# Patient Record
Sex: FEMALE | Race: WHITE | NOT HISPANIC OR LATINO
[De-identification: names, ages, dates, MRNs, and addresses within clinical notes are randomized per-mention and may not be internally consistent; named-entity substitution may affect disease eponyms.]

---

## 2018-11-10 PROBLEM — Z00.00 ENCOUNTER FOR PREVENTIVE HEALTH EXAMINATION: Status: ACTIVE | Noted: 2018-11-10

## 2018-12-10 ENCOUNTER — APPOINTMENT (OUTPATIENT)
Dept: ORTHOPEDIC SURGERY | Facility: CLINIC | Age: 63
End: 2018-12-10
Payer: COMMERCIAL

## 2018-12-10 VITALS
HEART RATE: 91 BPM | SYSTOLIC BLOOD PRESSURE: 193 MMHG | TEMPERATURE: 98.1 F | BODY MASS INDEX: 38.57 KG/M2 | WEIGHT: 240 LBS | DIASTOLIC BLOOD PRESSURE: 100 MMHG | HEIGHT: 66 IN

## 2018-12-10 DIAGNOSIS — Z82.61 FAMILY HISTORY OF ARTHRITIS: ICD-10-CM

## 2018-12-10 DIAGNOSIS — M25.561 PAIN IN RIGHT KNEE: ICD-10-CM

## 2018-12-10 PROCEDURE — 73562 X-RAY EXAM OF KNEE 3: CPT | Mod: RT

## 2018-12-10 PROCEDURE — 20610 DRAIN/INJ JOINT/BURSA W/O US: CPT | Mod: RT

## 2018-12-10 PROCEDURE — 99204 OFFICE O/P NEW MOD 45 MIN: CPT | Mod: 25

## 2019-03-18 ENCOUNTER — APPOINTMENT (OUTPATIENT)
Dept: ORTHOPEDIC SURGERY | Facility: CLINIC | Age: 64
End: 2019-03-18
Payer: COMMERCIAL

## 2019-03-18 VITALS — HEIGHT: 66 IN | TEMPERATURE: 98.1 F | WEIGHT: 240 LBS | BODY MASS INDEX: 38.57 KG/M2

## 2019-03-18 DIAGNOSIS — M17.11 UNILATERAL PRIMARY OSTEOARTHRITIS, RIGHT KNEE: ICD-10-CM

## 2019-03-18 PROCEDURE — 20610 DRAIN/INJ JOINT/BURSA W/O US: CPT

## 2019-03-18 PROCEDURE — 99214 OFFICE O/P EST MOD 30 MIN: CPT | Mod: 25

## 2019-03-18 NOTE — ADDENDUM
[FreeTextEntry1] : I, Demario Gandara, acted solely as a scribe for Dr. Sukhwinder Dyer on this date 03/18/2019.

## 2019-03-18 NOTE — PHYSICAL EXAM
[LE] : 5/5 motor strength in bilateral lower extremities [ALL] : dorsalis pedis, posterior tibial, femoral, popliteal, and radial 2+ and symmetric bilaterally [Obese] : obese [Acute Distress] : not in acute distress [Poor Appearance] : well-appearing [de-identified] : GENERAL APPEARANCE: Well nourished and hydrated, pleasant, alert, and oriented x 3. Appears their stated age. \par HEENT: Normocephalic, extraocular eye motion intact. Nasal septum midline. Oral cavity clear. External auditory canal clear. \par RESPIRATORY: Breath sounds clear and audible in all lobes. No wheezing, No accessory muscle use.\par CARDIOVASCULAR: No apparent abnormalities. No lower leg edema. No varicosities. Pedal pulses are palpable.\par NEUROLOGIC: Sensation is normal, no muscle weakness in the upper or lower extremities.\par DERMATOLOGIC: No apparent skin lesions, moist, warm, no rash.\par SPINE: Cervical spine appears normal and moves freely; thoracic spine appears normal and moves freely; lumbosacral spine appears normal and moves freely, normal, nontender.\par MUSCULOSKELETAL: Hands, wrists, and elbows are normal and move freely, shoulders are normal and move freely.  [de-identified] : Right knee examination demonstrates medial joint line tenderness, pain with ROM 0-100 degrees. \par

## 2019-03-18 NOTE — HISTORY OF PRESENT ILLNESS
[___ yrs] : [unfilled] year(s) ago [Worsening] : worsening [Constant] : ~He/She~ states the symptoms seem to be constant [Walking] : worsened by walking [Knee Flexion] : worsened with knee flexion [NSAIDs] : relieved by nonsteroidal anti-inflammatory drugs [Recumbency] : relieved by recumbency [Rest] : relieved by rest [de-identified] : 63 year old female presents with constant right knee pain that has been worsening for over 1 year. She has a known history of bone on bone medial compartment osteoarthritis of the right knee. She  cannot attribute her  pain to any specific injury or event. She notes the pain is mostly in the medial aspect of the knee. She takes and Aleve as needed and the NSAIDs alleviate the pain. Walking aggravates the pain. The patient works in a middle school as a . The patient has also been taking tumeric.  \par \par She has received cortisone injections in her right knee in the past, which provided great relief. She is interested in receiving an additional cortisone injection today.  [de-identified] : cortisone injections

## 2019-03-18 NOTE — DISCUSSION/SUMMARY
[de-identified] : 64 year old female presents with bone on bone medial compartment osteoarthritis of the right knee. We discussed the nature of the condition and the treatment options. Based on the x-ray and physical exam, the patient is a candidate for a right total knee replacement, which the patient will consider once conservative treatments have exhausted and have failed. I believe this would be the best option for long term relief. The patient elected to receive a right knee cortisone injection, which she tolerated well. \par \par The patient will follow up in 3 months for re-evaluation and possible repeat cortisone injections.\par \par A knee replacement means resurfacing of all 3 surfaces of the patella, the femur, and tibia with metal and plastic parts. The prosthetic parts are usually cemented into position and well outpatient range of motion from full extension to about 120° of flexion. The postoperative motion, however, is determined by multiple factors, the most important of which is preoperative motion. In general, the better motion preoperatively, the better the motion postoperatively. The operation, pending medical clearance, gently requires hospitalization of 3-4 days for one knee, 5-7 days for bilateral knee replacements. In general, we prefer to perform the procedure under spinal anesthesia with femoral nerve block and occasional single shot sciatic nerve block. We may ask a patient to give 2 units of blood for bilateral total knee arthroplasties, for one knee we institute a normogram which may include administration of preoperative Procrit. The operative procedure takes probably 1-2 hours. The operation requires a straight incision anywhere from 5-7 inches down from the knee. Postoperatively, the patient is positioned in a continuous passive motion machine within the first 24 hours and is walking the day after surgery. The first couple days are very painful and the pain medication will alleviate, but not eliminate the pain. The patient must really push hard to get range of motion. Our goal for having a person go home as that the range of motion is approximately 0-90° of flexion and that they can walk with a walker or cane. A walking aid is to be dispensed once the patient is secure enough. In general there, there is no cast or brace required with routine knee replacement. In the long term, we do not encourage our patients to run for the sake of running, although pending their preoperative status, we often allow patient to play doubles tennis or comparative activities. We also allowed them to do gentle intermediate downhill skiing if they are truly an expert skier. Biking is encouraged as well swimming. The followup periods are usually 3 weeks, 6 weeks, 3 months, and yearly intervals. Potential complications with total knee replacement included anesthetic complications and death, infection around 1%, nerve damage, by which means peroneal nerve palsy, footdrop or flapping foot with ambulation. This is particularly more apt to occur in the patient with a valgus or knock-knee deformity. The incidence can be quite high in this particular patient population. There will be areas of skin numbness, but this is not an untoward effect nor do we consider it a complication. Other potential complications include dislocation of the patella component, usually less than 2%; loosening of the tibial or femoral component is much more infrequent. Most often this occurs with infection or long-term use. Patient of extreme risk including markedly overweight patient's may be more prone to prosthetic wear. Major blood vessel damage is also extremely rare. Directly because of the anatomic proximity of the popliteal artery this could be lacerated with subsequent repair required. Be it unlikely, disruption of the popliteal artery could theoretically result in amputation. Similarly, infection could theoretically result in amputation if one were to grow out of an organism that cannot be controlled with antibiotics. General medical complications include phlebitis, for which we would prophylactically anticoagulate patients, but could still occur, and fatal pulmonary embolus which has been reported. Cardiovascular problems, such as heart attack or ischemia are always a concern with such hemodynamic changes in the blood vascular system. Other General complications are rare, but anything medicine could theoretically happen. I think the patient understands the risk benefit ratio of total knee replacement and will think about whether there like to pursue with an operation or nonoperative treatment program.

## 2019-03-18 NOTE — REASON FOR VISIT
[Follow-Up Visit] : a follow-up visit for [Knee Pain] : knee pain [FreeTextEntry2] : Right knee pain

## 2019-03-18 NOTE — PROCEDURE
[de-identified] : I injected the patient's right knee today with cortisone.\par \par I discussed at length with the patient the planned steroid and lidocaine injection. The risks, benefits, convalescence and alternatives were reviewed. The possible side effects discussed included but were not limited to: pain, swelling, heat, bleeding, and redness. Symptoms are generally mild but if they are extensive then contact the office. Giving pain relievers by mouth such as NSAIDs or Tylenol can generally treat the reactions to steroid and lidocaine. Rare cases of infection have been noted. Rash, hives and itching may occur post injection. If you have muscle pain or cramps, flushing and or swelling of the face, rapid heart beat, nausea, dizziness, fever, chills, headache, difficulty breathing, swelling in the arms or legs, or have a prickly feeling of your skin, contact a health care provider immediately. Following this discussion, the knee was prepped with Alcohol and under sterile condition the 80 mg Depo-Medrol and 6 cc Lidocaine injection was performed with a 20 gauge needle through a superolateral injection site. The needle was introduced into the joint, aspiration was performed to ensure intra-articular placement and the medication was injected. Upon withdrawal of the needle the site was cleaned with alcohol and a band aid applied. The patient tolerated the injection well and there were no adverse effects. Post injection instructions included no strenuous activity for 24 hours, cryotherapy and if there are any adverse effects to contact the office. \par \par \par

## 2021-11-28 ENCOUNTER — EMERGENCY (EMERGENCY)
Facility: HOSPITAL | Age: 66
LOS: 1 days | Discharge: HOME | End: 2021-11-28
Attending: EMERGENCY MEDICINE
Payer: COMMERCIAL

## 2021-11-28 VITALS
DIASTOLIC BLOOD PRESSURE: 104 MMHG | WEIGHT: 240.08 LBS | OXYGEN SATURATION: 98 % | RESPIRATION RATE: 18 BRPM | TEMPERATURE: 98 F | SYSTOLIC BLOOD PRESSURE: 152 MMHG | HEIGHT: 66 IN | HEART RATE: 112 BPM

## 2021-11-28 PROCEDURE — 99285 EMERGENCY DEPT VISIT HI MDM: CPT

## 2021-11-28 NOTE — ED PROVIDER NOTE - NS ED MD DISPO DIVISION OBS
Notification Instructions: Patient will be notified of biopsy results. However, patient instructed to call the office if not contacted within 2 weeks. [facility]

## 2021-11-28 NOTE — ED PROVIDER NOTE - CLINICAL SUMMARY MEDICAL DECISION MAKING FREE TEXT BOX
67 yo female presents to ED c/o cough/chest discomfort x3 days. Patient found to be in new onset A-fib, unknown cause. Lovenox started. Patient placed in CDU for telemetry, serial troponin, TTE.

## 2021-11-28 NOTE — ED PROVIDER NOTE - OBJECTIVE STATEMENT
67 yo female presents to ED c/o cough/chest discomfort x3 days. Patient states Friday work up with vomiting and diarrhea. States since then, patient has been wheezing having "heart burn," normally improved completely with Mylanta and omeprazole, but now initially improves, then reoccurs. Patient admits to being anxious. Patient is completely vaccinated. +Sick contacts. Patient does not follow with PCP. No further complaints at this time.   Denies daily medications, heart disease, diaphoresis, fevers, chest pain, SOB, abdominal pain.

## 2021-11-28 NOTE — ED ADULT TRIAGE NOTE - CHIEF COMPLAINT QUOTE
Ambulatory complaining of cough since Friday. States that she has had what she thought was a sinus infection or a virus because her children are sick however she is unsure and very anxious. Patient is thrice vaccinated for COVID.

## 2021-11-28 NOTE — ED PROVIDER NOTE - CONSTITUTIONAL, MLM
normal... Well appearing, but anxious, awake, alert, oriented to person, place, time/situation and in no apparent distress.

## 2021-11-28 NOTE — ED PROVIDER NOTE - ATTENDING CONTRIBUTION TO CARE
I personally saw the patient with the PA, and completed the key components of the history and physical exam. I then discussed the management plan with the PA.   gen in nad resp clear cardiac irregular irregular abd soft neuro intact

## 2021-11-29 VITALS
OXYGEN SATURATION: 98 % | HEART RATE: 88 BPM | DIASTOLIC BLOOD PRESSURE: 90 MMHG | SYSTOLIC BLOOD PRESSURE: 159 MMHG | RESPIRATION RATE: 16 BRPM

## 2021-11-29 LAB
ANION GAP SERPL CALC-SCNC: 13 MMOL/L — SIGNIFICANT CHANGE UP (ref 5–17)
APTT BLD: 28.3 SEC — SIGNIFICANT CHANGE UP (ref 27.5–35.5)
BASOPHILS # BLD AUTO: 0.03 K/UL — SIGNIFICANT CHANGE UP (ref 0–0.2)
BASOPHILS NFR BLD AUTO: 0.3 % — SIGNIFICANT CHANGE UP (ref 0–2)
BUN SERPL-MCNC: 11.8 MG/DL — SIGNIFICANT CHANGE UP (ref 8–20)
CALCIUM SERPL-MCNC: 10 MG/DL — SIGNIFICANT CHANGE UP (ref 8.6–10.2)
CHLORIDE SERPL-SCNC: 103 MMOL/L — SIGNIFICANT CHANGE UP (ref 98–107)
CO2 SERPL-SCNC: 26 MMOL/L — SIGNIFICANT CHANGE UP (ref 22–29)
CREAT SERPL-MCNC: 0.89 MG/DL — SIGNIFICANT CHANGE UP (ref 0.5–1.3)
EOSINOPHIL # BLD AUTO: 0.23 K/UL — SIGNIFICANT CHANGE UP (ref 0–0.5)
EOSINOPHIL NFR BLD AUTO: 2.4 % — SIGNIFICANT CHANGE UP (ref 0–6)
GLUCOSE SERPL-MCNC: 95 MG/DL — SIGNIFICANT CHANGE UP (ref 70–99)
HCT VFR BLD CALC: 47 % — HIGH (ref 34.5–45)
HGB BLD-MCNC: 15.3 G/DL — SIGNIFICANT CHANGE UP (ref 11.5–15.5)
IMM GRANULOCYTES NFR BLD AUTO: 0.3 % — SIGNIFICANT CHANGE UP (ref 0–1.5)
INR BLD: 1.07 RATIO — SIGNIFICANT CHANGE UP (ref 0.88–1.16)
LYMPHOCYTES # BLD AUTO: 1.96 K/UL — SIGNIFICANT CHANGE UP (ref 1–3.3)
LYMPHOCYTES # BLD AUTO: 20.5 % — SIGNIFICANT CHANGE UP (ref 13–44)
MCHC RBC-ENTMCNC: 28.8 PG — SIGNIFICANT CHANGE UP (ref 27–34)
MCHC RBC-ENTMCNC: 32.6 GM/DL — SIGNIFICANT CHANGE UP (ref 32–36)
MCV RBC AUTO: 88.3 FL — SIGNIFICANT CHANGE UP (ref 80–100)
MONOCYTES # BLD AUTO: 0.88 K/UL — SIGNIFICANT CHANGE UP (ref 0–0.9)
MONOCYTES NFR BLD AUTO: 9.2 % — SIGNIFICANT CHANGE UP (ref 2–14)
NEUTROPHILS # BLD AUTO: 6.45 K/UL — SIGNIFICANT CHANGE UP (ref 1.8–7.4)
NEUTROPHILS NFR BLD AUTO: 67.3 % — SIGNIFICANT CHANGE UP (ref 43–77)
NT-PROBNP SERPL-SCNC: 1655 PG/ML — HIGH (ref 0–300)
PLATELET # BLD AUTO: 222 K/UL — SIGNIFICANT CHANGE UP (ref 150–400)
POTASSIUM SERPL-MCNC: 3.6 MMOL/L — SIGNIFICANT CHANGE UP (ref 3.5–5.3)
POTASSIUM SERPL-SCNC: 3.6 MMOL/L — SIGNIFICANT CHANGE UP (ref 3.5–5.3)
PROTHROM AB SERPL-ACNC: 12.4 SEC — SIGNIFICANT CHANGE UP (ref 10.6–13.6)
RBC # BLD: 5.32 M/UL — HIGH (ref 3.8–5.2)
RBC # FLD: 13.2 % — SIGNIFICANT CHANGE UP (ref 10.3–14.5)
SODIUM SERPL-SCNC: 142 MMOL/L — SIGNIFICANT CHANGE UP (ref 135–145)
TROPONIN T SERPL-MCNC: <0.01 NG/ML — SIGNIFICANT CHANGE UP (ref 0–0.06)
TROPONIN T SERPL-MCNC: <0.01 NG/ML — SIGNIFICANT CHANGE UP (ref 0–0.06)
TSH SERPL-MCNC: 0.85 UIU/ML — SIGNIFICANT CHANGE UP (ref 0.27–4.2)
WBC # BLD: 9.58 K/UL — SIGNIFICANT CHANGE UP (ref 3.8–10.5)
WBC # FLD AUTO: 9.58 K/UL — SIGNIFICANT CHANGE UP (ref 3.8–10.5)

## 2021-11-29 PROCEDURE — 80048 BASIC METABOLIC PNL TOTAL CA: CPT

## 2021-11-29 PROCEDURE — 93005 ELECTROCARDIOGRAM TRACING: CPT

## 2021-11-29 PROCEDURE — 84484 ASSAY OF TROPONIN QUANT: CPT

## 2021-11-29 PROCEDURE — 36415 COLL VENOUS BLD VENIPUNCTURE: CPT

## 2021-11-29 PROCEDURE — 84443 ASSAY THYROID STIM HORMONE: CPT

## 2021-11-29 PROCEDURE — 93010 ELECTROCARDIOGRAM REPORT: CPT

## 2021-11-29 PROCEDURE — 85025 COMPLETE CBC W/AUTO DIFF WBC: CPT

## 2021-11-29 PROCEDURE — 85610 PROTHROMBIN TIME: CPT

## 2021-11-29 PROCEDURE — 85730 THROMBOPLASTIN TIME PARTIAL: CPT

## 2021-11-29 PROCEDURE — 93306 TTE W/DOPPLER COMPLETE: CPT | Mod: 26

## 2021-11-29 PROCEDURE — C8929: CPT

## 2021-11-29 PROCEDURE — 71045 X-RAY EXAM CHEST 1 VIEW: CPT

## 2021-11-29 PROCEDURE — 71045 X-RAY EXAM CHEST 1 VIEW: CPT | Mod: 26

## 2021-11-29 PROCEDURE — 83880 ASSAY OF NATRIURETIC PEPTIDE: CPT

## 2021-11-29 PROCEDURE — 99284 EMERGENCY DEPT VISIT MOD MDM: CPT | Mod: 25

## 2021-11-29 PROCEDURE — 99234 HOSP IP/OBS SM DT SF/LOW 45: CPT

## 2021-11-29 PROCEDURE — G0378: CPT

## 2021-11-29 RX ORDER — ALPRAZOLAM 0.25 MG
0.25 TABLET ORAL ONCE
Refills: 0 | Status: DISCONTINUED | OUTPATIENT
Start: 2021-11-29 | End: 2021-11-29

## 2021-11-29 RX ORDER — FAMOTIDINE 10 MG/ML
20 INJECTION INTRAVENOUS ONCE
Refills: 0 | Status: COMPLETED | OUTPATIENT
Start: 2021-11-29 | End: 2021-11-29

## 2021-11-29 RX ORDER — LIDOCAINE 4 G/100G
10 CREAM TOPICAL ONCE
Refills: 0 | Status: COMPLETED | OUTPATIENT
Start: 2021-11-29 | End: 2021-11-29

## 2021-11-29 RX ORDER — ENOXAPARIN SODIUM 100 MG/ML
100 INJECTION SUBCUTANEOUS ONCE
Refills: 0 | Status: DISCONTINUED | OUTPATIENT
Start: 2021-11-29 | End: 2021-11-29

## 2021-11-29 RX ORDER — DILTIAZEM HCL 120 MG
90 CAPSULE, EXT RELEASE 24 HR ORAL DAILY
Refills: 0 | Status: DISCONTINUED | OUTPATIENT
Start: 2021-11-29 | End: 2021-12-03

## 2021-11-29 RX ORDER — APIXABAN 2.5 MG/1
1 TABLET, FILM COATED ORAL
Qty: 60 | Refills: 0
Start: 2021-11-29 | End: 2021-12-28

## 2021-11-29 RX ORDER — APIXABAN 2.5 MG/1
5 TABLET, FILM COATED ORAL
Refills: 0 | Status: DISCONTINUED | OUTPATIENT
Start: 2021-11-29 | End: 2021-12-03

## 2021-11-29 RX ORDER — DILTIAZEM HCL 120 MG
3 CAPSULE, EXT RELEASE 24 HR ORAL
Qty: 90 | Refills: 0
Start: 2021-11-29 | End: 2021-12-28

## 2021-11-29 RX ORDER — ENOXAPARIN SODIUM 100 MG/ML
100 INJECTION SUBCUTANEOUS
Refills: 0 | Status: DISCONTINUED | OUTPATIENT
Start: 2021-11-29 | End: 2021-11-29

## 2021-11-29 RX ORDER — AZITHROMYCIN 500 MG/1
1 TABLET, FILM COATED ORAL
Qty: 1 | Refills: 0
Start: 2021-11-29 | End: 2021-12-03

## 2021-11-29 RX ORDER — METOPROLOL TARTRATE 50 MG
25 TABLET ORAL
Refills: 0 | Status: DISCONTINUED | OUTPATIENT
Start: 2021-11-29 | End: 2021-12-03

## 2021-11-29 RX ADMIN — LIDOCAINE 10 MILLILITER(S): 4 CREAM TOPICAL at 00:20

## 2021-11-29 RX ADMIN — Medication 30 MILLILITER(S): at 00:20

## 2021-11-29 RX ADMIN — Medication 0.25 MILLIGRAM(S): at 00:20

## 2021-11-29 RX ADMIN — FAMOTIDINE 20 MILLIGRAM(S): 10 INJECTION INTRAVENOUS at 00:20

## 2021-11-29 RX ADMIN — ENOXAPARIN SODIUM 100 MILLIGRAM(S): 100 INJECTION SUBCUTANEOUS at 03:48

## 2021-11-29 RX ADMIN — Medication 25 MILLIGRAM(S): at 06:04

## 2021-11-29 NOTE — ED CDU PROVIDER DISPOSITION NOTE - NSFOLLOWUPCLINICS_GEN_ALL_ED_FT
[facility]  Cardiology  [address], [address]  [place] [address]  Phone: [phone]  Fax:

## 2021-11-29 NOTE — ED ADULT NURSE REASSESSMENT NOTE - NS ED NURSE REASSESS COMMENT FT1
Pt A&O x 4 comfortable, denies complaints at this time. Denies pain. Nonslip footwear. Bed locked and in lowest position. Call bell within reach. Able to make needs known. Will continue to monitor. Yared PEOPLES made aware of vitals.

## 2021-11-29 NOTE — ED CDU PROVIDER INITIAL DAY NOTE - OBJECTIVE STATEMENT
65 yo female presents to ED c/o cough/chest discomfort x3 days. Patient states Friday work up with vomiting and diarrhea. States since then, patient has been wheezing having "heart burn," normally improved completely with Mylanta and omeprazole, but now initially improves, then reoccurs. Patient admits to being anxious. Patient is completely vaccinated. +Sick contacts. Patient does not follow with PCP. No further complaints at this time.   Denies daily medications, heart disease, diaphoresis, fevers, chest pain, SOB, abdominal pain.

## 2021-11-29 NOTE — CONSULT NOTE ADULT - ATTENDING COMMENTS
Afib rate controlled  CHADSVASC 2 ( age and female)   recommend eliquis 5 mg twice daily .   Cardizem CD 90 mg for rate control   AFib either paroxysmal or related to viral infection   X ray showed bilateral small infiltrates and patient had expiratory wheezes on exam ( likely viral respiratory infection )   consider covid testing.

## 2021-11-29 NOTE — CONSULT NOTE ADULT - SUBJECTIVE AND OBJECTIVE BOX
[facility]                                                               Office:  [address] [place] [address]                                                              Telephone: [phone]. Fax:[phone]                                                                        CARDIOLOGY CONSULTATION NOTE                                                                                             Consult requested by:  RICHELLE Neil  Reason for Consultation:   History obtained by: Patient and medical record   obtained: No    Chief complaint:    Patient is a 66y old  Female who presents with a chief complaint of nausea/vomitting      HPI: Pt is a 67 y/o female with no pertinent medical history who presents to [facility]-ED for Afib. Pt states that after Thanksgiving, she has been experiencing nausea/vomiting and diarrhea. After vomiting patient complains of burning, intermittent sensation in chest lasting for hours. Pt states that multiple family members have experienced GI symptoms after Thanksgiving. Pt also had associated symptoms of coughing and wheezing. Pt symptoms were not relieved, so she came to ED for evaluation. In ED, ECG-Afib HR @ 81, Tropx2-negative, BNP-1655, TSH-0.85, XRAY-Small bilateral infiltrates as above. Pt has no previous history of Afib.          REVIEW OF SYMPTOMS:     CONSTITUTIONAL: No fever, weight loss, or fatigue  ENMT:  No difficulty hearing, tinnitus, vertigo; No sinus or throat pain  NECK: No pain or stiffness  CARDIOVASCULAR: See HPI  RESPIRATORY: No Dyspnea on exertion, Shortness of breath, cough, wheezing  : No dysuria, no hematuria   GI: No dark color stool, no melena, no diarrhea, no constipation, no abdominal pain   NEURO: No headache, no dizziness, no slurred speech   MUSCULOSKELETAL: No joint pain or swelling; No muscle, back, or extremity pain  PSYCH: No agitation, no anxiety.    ALL OTHER REVIEW OF SYSTEMS ARE NEGATIVE.      PREVIOUS DIAGNOSTIC TESTING    ECHO FINDINGS:< from: TTE Echo Complete w/ Contrast w/ Doppler (11.29.21 @ 10:53) >  Summary:   1. Increased LV wall thickness.   2. Normal global left ventricular systolic function.   3. Left ventricular ejection fraction, by visual estimation, is 60 to 65%.   4. The mitral in-flow pattern reveals no discernable A-wave, therefore no comment on diastolic function can be made.   5. RV systolic functionis low normal.   6. Mildly enlarged left atrium.   7. Moderately enlarged right atrium.   8. Normal trileaflet aortic valve with normal opening.   9. Mild thickening and calcification of the anterior and posterior mitral valve leaflets.  10. Moderatemitral annular calcification.  11. Moderate mitral valve regurgitation.  12. Mild-moderate tricuspid regurgitation.  13. Estimated pulmonary artery systolic pressure is 36.6 mmHg assuming a right atrial pressure of 3 mmHg, which is consistent with borderline pulmonary hypertension.        ALLERGIES: Allergies    No Known Allergies    Intolerances      PAST MEDICAL HISTORY      PAST SURGICAL HISTORY      FAMILY HISTORY:  Mother MI age 60/70s     SOCIAL HISTORY:  Denies smoking/alcohol/drugs      CURRENT MEDICATIONS:  metoprolol tartrate 25 milliGRAM(s) Oral two times a day  enoxaparin Injectable      Vital Signs Last 24 Hrs  T(C): 36.6 (29 Nov 2021 03:56), Max: 36.7 (28 Nov 2021 23:21)  T(F): 97.8 (29 Nov 2021 03:56), Max: 98 (28 Nov 2021 23:21)  HR: 88 (29 Nov 2021 06:02) (81 - 112)  BP: 159/90 (29 Nov 2021 06:02) (152/104 - 168/106)  RR: 16 (29 Nov 2021 06:02) (16 - 18)  SpO2: 98% (29 Nov 2021 06:02) (98% - 99%)      PHYSICAL EXAM:  Constitutional: Comfortable . No acute distress.   HEENT: Atraumatic and normocephalic , neck is supple . no JVD. No carotid bruit. PEERL   CNS: A&Ox3. No focal deficits. EOMI.    Lymph Nodes: Cervical : Not palpable.  Respiratory: bilateral wheezes   Cardiovascular: S1S2 RRR. No murmur/rubs or gallop.  Gastrointestinal: Soft non-tender and non distended . +Bowel sounds. negative Sy's sign.  Extremities: No edema.   Psychiatric: Calm . no agitation.  Skin: No skin rash/ulcers visualized to face, hands or feet.    Intake and output:     LABS:                        15.3   9.58  )-----------( 222      ( 29 Nov 2021 00:53 )             47.0     11-29    142  |  103  |  11.8  ----------------------------<  95  3.6   |  26.0  |  0.89    Ca    10.0      29 Nov 2021 00:53      CARDIAC MARKERS ( 29 Nov 2021 06:17 )  x     / <0.01 ng/mL / x     / x     / x      CARDIAC MARKERS ( 29 Nov 2021 00:53 )  x     / <0.01 ng/mL / x     / x     / x        ;p-BNP=Serum Pro-Brain Natriuretic Peptide: 1655 pg/mL (11-29 @ 00:53)    PT/INR - ( 29 Nov 2021 00:54 )   PT: 12.4 sec;   INR: 1.07 ratio         PTT - ( 29 Nov 2021 00:54 )  PTT:28.3 sec      INTERPRETATION OF TELEMETRY: Afib HR @ 60-80s  ECG: Afib HR @ 81    RADIOLOGY & ADDITIONAL STUDIES:      X-ray:  < from: Xray Chest 1 View- PORTABLE-Urgent (Xray Chest 1 View- PORTABLE-Urgent .) (11.29.21 @ 01:02) >  Heart size magnified by technique.    There are small atelectatic infiltrates in the lower lung fields left greater than right.    IMPRESSION: Small bilateral infiltrates as above.

## 2021-11-29 NOTE — CONSULT NOTE ADULT - ASSESSMENT
Pt is a 67 y/o female with no pertinent medical history who presents to [facility]-ED for Afib. Pt states that after Thanksgiving, she has been experiencing nausea/vomiting and diarrhea. After vomiting patient complains of burning, intermittent sensation in chest lasting for hours. Pt states that multiple family members have experienced GI symptoms after Thanksgiving. Pt also had associated symptoms of coughing and wheezing. Pt symptoms were not relieved, so she came to ED for evaluation. In ED, ECG-Afib HR @ 81, Tropx2-negative, BNP-1655, TSH-0.85, XRAY-Small bilateral infiltrates as above. Pt has no previous history of Afib.        Afib  -ECG-Afib HR @ 81  -Tropx2-negative  -BNP-1655  -TSH-0.85  -XRAY-Small bilateral infiltrates as above  -CHADSVASC score 2 (age +1, female +1)  -Discussed arrythmia, plan for AC risks vs. benefits of AC. Pt verbalized understanding and is agreeable to starting AC  -Start Eliquis 5mg BID  -Start Cardizem 90mg  -Plan for outpatient follow up in outpatient cardiology office in 2 weeks     Respiratory Infection (Likely Viral)  -XRAY-Small bilateral infiltrates as above  -symptomatic treatment of resp. infection  -Consider COVID testing

## 2021-11-29 NOTE — ED CDU PROVIDER DISPOSITION NOTE - PATIENT PORTAL LINK FT
You can access the FollowMyHealth Patient Portal offered by [facility] by registering at the following website: [email or web address]. By joining [facility]’s FollowMyHealth portal, you will also be able to view your health information using other applications (apps) compatible with our system.

## 2021-11-29 NOTE — ED ADULT NURSE NOTE - OBJECTIVE STATEMENT
Pt c/o cough, N/V/D, and heartburn since Friday. Pt states others at home are sick. Pt states last episode of N/V/D was on Friday 11/26/21. Pt reports wheezing but BL lung sounds clear in all fields. Respirations are even and unlabored. No LE edema present. Abdomen soft and non distended. Pt denies CP at this time. Pt placed on portable telemonitor. Will continue to monitor.

## 2021-11-29 NOTE — ED CDU PROVIDER DISPOSITION NOTE - NSFOLLOWUPINSTRUCTIONS_ED_ALL_ED_FT
Follow up with cardiology within 1-2 weeks.  Take medication as prescribed.  Come back with new or worsening symptoms.  Atrial Fibrillation    Atrial fibrillation is a type of irregular heartbeat (arrhythmia) where the heart quivers continuously in a chaotic pattern that makes the heart unable to pump blood normally. This can increase the risk for stroke, heart failure, and other heart-related conditions. Atrial fibrillation can be caused by a variety of conditions and may be temporary, intermittent, or permanent. Symptoms include feeling that your heart is beating rapidly or irregularly, chest discomfort, shortness of breath, or dizziness/lightheadedness that may be worse with exertion. Treatment is varied but may involve medication or electrical shock (cardioversion).    SEEK IMMEDIATE MEDICAL CARE IF YOU HAVE ANY OF THE FOLLOWING SYMPTOMS: chest pain, shortness of breath, abdominal pain, sweating, vomiting, blood in vomit/bowel movements/urine, dizziness/lightheadedness, weakness or numbness to face/arm/leg, trouble speaking or understanding, facial droop.

## 2021-11-29 NOTE — ED CDU PROVIDER DISPOSITION NOTE - CLINICAL COURSE
67 yo female presents to ED c/o cough/chest discomfort x3 days. Patient states Friday work up with vomiting and diarrhea. States since then, patient has been wheezing having "heart burn," normally improved completely with Mylanta and omeprazole, but now initially improves, then reoccurs. Patient admits to being anxious. Patient is completely vaccinated. +Sick contacts. Patient does not follow with PCP. No further complaints at this time. Denies daily medications, heart disease, diaphoresis, fevers, chest pain, SOB, abdominal pain. Pt had ECHO and was seen by cards and recommends anticoagulation  Cardizem and f/u. Will dc with abx for b/l infiltrates on cxr.

## 2021-11-29 NOTE — ED CDU PROVIDER INITIAL DAY NOTE - MEDICAL DECISION MAKING DETAILS
67 yo female presents to ED c/o cough/chest discomfort x3 days. Patient found to be in new onset A-fib, unknown cause. Lovenox started. Patient placed in CDU for telemetry, serial troponin, TTE. Further management plan pending results.

## 2021-11-30 NOTE — ED POST DISCHARGE NOTE - RESULT SUMMARY
CXR shows small bilateral infiltrates, spoke to patient on Z-pack, advised to f/u with PCP for rpt XR for resolution

## 2022-01-28 ENCOUNTER — APPOINTMENT (OUTPATIENT)
Dept: FAMILY MEDICINE | Facility: CLINIC | Age: 67
End: 2022-01-28

## 2023-10-12 ENCOUNTER — NON-APPOINTMENT (OUTPATIENT)
Age: 68
End: 2023-10-12

## 2024-01-18 ENCOUNTER — APPOINTMENT (OUTPATIENT)
Dept: DERMATOLOGY | Facility: CLINIC | Age: 69
End: 2024-01-18
Payer: COMMERCIAL

## 2024-01-18 PROCEDURE — 99202 OFFICE O/P NEW SF 15 MIN: CPT | Mod: 25

## 2024-01-18 PROCEDURE — 17003 DESTRUCT PREMALG LES 2-14: CPT

## 2024-01-18 PROCEDURE — 17000 DESTRUCT PREMALG LESION: CPT

## 2024-02-09 ENCOUNTER — APPOINTMENT (OUTPATIENT)
Dept: DERMATOLOGY | Facility: CLINIC | Age: 69
End: 2024-02-09

## 2025-07-08 ENCOUNTER — APPOINTMENT (OUTPATIENT)
Dept: DERMATOLOGY | Facility: CLINIC | Age: 70
End: 2025-07-08
Payer: COMMERCIAL

## 2025-07-08 PROCEDURE — 17000 DESTRUCT PREMALG LESION: CPT

## 2025-07-08 PROCEDURE — 99213 OFFICE O/P EST LOW 20 MIN: CPT | Mod: 25

## 2025-08-21 ENCOUNTER — APPOINTMENT (OUTPATIENT)
Dept: DERMATOLOGY | Facility: CLINIC | Age: 70
End: 2025-08-21